# Patient Record
Sex: MALE | Race: WHITE | NOT HISPANIC OR LATINO | Employment: FULL TIME | URBAN - METROPOLITAN AREA
[De-identification: names, ages, dates, MRNs, and addresses within clinical notes are randomized per-mention and may not be internally consistent; named-entity substitution may affect disease eponyms.]

---

## 2019-07-19 ENCOUNTER — HOSPITAL ENCOUNTER (EMERGENCY)
Facility: HOSPITAL | Age: 24
Discharge: HOME | End: 2019-07-19
Attending: EMERGENCY MEDICINE | Admitting: EMERGENCY MEDICINE
Payer: COMMERCIAL

## 2019-07-19 VITALS
BODY MASS INDEX: 19.62 KG/M2 | TEMPERATURE: 97.8 F | DIASTOLIC BLOOD PRESSURE: 72 MMHG | HEART RATE: 88 BPM | RESPIRATION RATE: 16 BRPM | HEIGHT: 67 IN | OXYGEN SATURATION: 98 % | WEIGHT: 125 LBS | SYSTOLIC BLOOD PRESSURE: 117 MMHG

## 2019-07-19 DIAGNOSIS — T67.5XXA HEAT EXHAUSTION, INITIAL ENCOUNTER: ICD-10-CM

## 2019-07-19 DIAGNOSIS — R42 LIGHTHEADEDNESS: Primary | ICD-10-CM

## 2019-07-19 DIAGNOSIS — E86.0 DEHYDRATION: ICD-10-CM

## 2019-07-19 LAB
ATRIAL RATE: 84 BPM
BACTERIA UR QL AUTO: NORMAL /HPF
BILIRUB UR QL STRIP: NEGATIVE
CLARITY UR: CLEAR
COLOR UR: YELLOW
COLOR, POC: NORMAL
GLUCOSE UR STRIP-MCNC: NEGATIVE MG/DL
HGB UR QL STRIP.AUTO: NEGATIVE
HYALINE CASTS #/AREA URNS LPF: NORMAL /LPF
KETONES UR STRIP-MCNC: NEGATIVE MG/DL
LEUKOCYTE ESTERASE UR QL STRIP: NEGATIVE
NITRITE UR QL STRIP: NEGATIVE
NON-SQ EPI CELLS URNS QL MICRO: NORMAL /HPF
P AXIS: 66 DEGREES
PH UR STRIP.AUTO: 6.5 [PH] (ref 4.5–8)
PR INTERVAL: 186 MS
PROT UR STRIP-MCNC: ABNORMAL MG/DL
QRS AXIS: 47 DEGREES
QRSD INTERVAL: 96 MS
QT INTERVAL: 348 MS
QTC INTERVAL: 411 MS
RBC #/AREA URNS AUTO: NORMAL /HPF
SP GR UR STRIP.AUTO: 1.02 (ref 1–1.03)
T WAVE AXIS: 62 DEGREES
UROBILINOGEN UR QL STRIP.AUTO: 0.2 E.U./DL
VENTRICULAR RATE: 84 BPM
WBC #/AREA URNS AUTO: NORMAL /HPF

## 2019-07-19 PROCEDURE — 93010 ELECTROCARDIOGRAM REPORT: CPT | Performed by: INTERNAL MEDICINE

## 2019-07-19 PROCEDURE — 99284 EMERGENCY DEPT VISIT MOD MDM: CPT | Performed by: EMERGENCY MEDICINE

## 2019-07-19 PROCEDURE — 81001 URINALYSIS AUTO W/SCOPE: CPT

## 2019-07-19 PROCEDURE — 96361 HYDRATE IV INFUSION ADD-ON: CPT

## 2019-07-19 PROCEDURE — 96374 THER/PROPH/DIAG INJ IV PUSH: CPT

## 2019-07-19 PROCEDURE — 99284 EMERGENCY DEPT VISIT MOD MDM: CPT

## 2019-07-19 PROCEDURE — 93005 ELECTROCARDIOGRAM TRACING: CPT

## 2019-07-19 RX ORDER — ONDANSETRON 2 MG/ML
4 INJECTION INTRAMUSCULAR; INTRAVENOUS ONCE
Status: COMPLETED | OUTPATIENT
Start: 2019-07-19 | End: 2019-07-19

## 2019-07-19 RX ADMIN — SODIUM CHLORIDE 1000 ML: 0.9 INJECTION, SOLUTION INTRAVENOUS at 12:36

## 2019-07-19 RX ADMIN — ONDANSETRON 4 MG: 2 INJECTION INTRAMUSCULAR; INTRAVENOUS at 12:36

## 2019-07-19 NOTE — ED PROVIDER NOTES
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 , he did eat breakfast this morning but has not been drinking the appropriate amount of fluids  He states he was outdoors when his symptoms started suddenly, he felt very lightheaded and went inside to sit down; a co-worker was with him and noted that he was slumping forward in the chair and seemed generally weak  Per co-worker, he told them that he felt as if the carpet was jumping around him    States it felt as if the room was spinning somewhat as well  Currently denies vertigo but does endorse lightheadedness and nausea that has persisted  He denies headache, neck pain or neck stiffness, no vision changes, no diplopia, no tinnitus or hearing changes, no focal weakness, denies sensory changes, no recent trauma to the head or neck  He denies chest pain, states he felt slightly short of breath when he was outdoors but after being indoors and resting this resolved  No palpitations, no history of DVT or PE, no family history of DVT or PE, no history of sudden cardiac death in his family, no history of arrhythmias  Patient denies presyncope, syncope, he has no past history of any these symptoms before  Sitting down in a chair did somewhat alleviated symptoms  Co-worker who was with him denies any abnormal eye movements at the time  Patient denies ETOH, denies illicit drug was, takes no medications        Dizziness   Quality:  Lightheadedness and head spinning  Onset quality:  Sudden  Timing:  Constant  Progression:  Partially resolved  Chronicity:  New  Context: not with ear pain, not with eye movement and not with loss of consciousness    Relieved by:  Being still  Worsened by:  Standing up  Associated symptoms: nausea    Associated symptoms: no blood in stool, no chest pain, no diarrhea, no headaches, no hearing loss, no palpitations, no shortness of breath, no syncope, no tinnitus, no vision changes, no vomiting and no weakness    Risk factors: no anemia, no heart disease, no hx of stroke, no hx of vertigo, no Meniere's disease, no multiple medications and no new medications        None       History reviewed  No pertinent past medical history  History reviewed  No pertinent surgical history  History reviewed  No pertinent family history  I have reviewed and agree with the history as documented  Social History     Tobacco Use    Smoking status: Not on file   Substance Use Topics    Alcohol use: Not on file    Drug use: Not on file        Review of Systems   Constitutional: Negative  Negative for appetite change, chills and fever  HENT: Negative  Negative for hearing loss and tinnitus  Eyes: Negative  Negative for photophobia and visual disturbance  Respiratory: Negative  Negative for cough, chest tightness and shortness of breath  Cardiovascular: Negative  Negative for chest pain, palpitations, leg swelling and syncope  Gastrointestinal: Positive for nausea  Negative for abdominal pain, blood in stool, constipation, diarrhea and vomiting  Endocrine: Negative  Genitourinary: Negative  Negative for difficulty urinating, dysuria, flank pain, frequency and urgency  Musculoskeletal: Negative  Negative for back pain, neck pain and neck stiffness  Skin: Negative  Allergic/Immunologic: Negative  Neurological: Positive for dizziness and light-headedness  Negative for weakness and headaches  Hematological: Negative  Psychiatric/Behavioral: Negative          Physical Exam  ED Triage Vitals   Temperature Pulse Respirations Blood Pressure SpO2 07/19/19 1209 07/19/19 1208 07/19/19 1208 07/19/19 1208 07/19/19 1208   97 8 °F (36 6 °C) 97 20 127/87 98 %      Temp Source Heart Rate Source Patient Position - Orthostatic VS BP Location FiO2 (%)   07/19/19 1209 07/19/19 1245 07/19/19 1245 07/19/19 1245 --   Oral Monitor Lying Left arm       Pain Score       07/19/19 1208       No Pain             Orthostatic Vital Signs  Vitals:    07/19/19 1401 07/19/19 1415 07/19/19 1430 07/19/19 1445   BP: 109/64  117/72    Pulse: 104 88 (!) 106 88   Patient Position - Orthostatic VS:           Physical Exam   Constitutional: He is oriented to person, place, and time  He appears well-developed and well-nourished  HENT:   Head: Normocephalic and atraumatic  Right Ear: External ear normal    Left Ear: External ear normal    Nose: Nose normal    Dry mucous membranes   Eyes: Conjunctivae and EOM are normal  No scleral icterus  Neck: Normal range of motion  No JVD present  No tracheal deviation present  Cardiovascular: Regular rhythm, normal heart sounds and intact distal pulses  No murmur heard  Tachycardic regular   Pulmonary/Chest: Effort normal and breath sounds normal  No respiratory distress  Abdominal: Soft  Bowel sounds are normal  He exhibits no distension  There is no tenderness  There is no guarding  Musculoskeletal: Normal range of motion  He exhibits no edema  Neurological: He is alert and oriented to person, place, and time  He exhibits normal muscle tone  Patient is alert and oriented to person, place, time, and situation  Speech is normal with no dysarthria, no aphasia  Cranial nerves II-XII intact  Sensation is intact bilaterally upper and lower extremities  Normal muscle tone, no clonus, no atrophy  5/5 muscle strength bilaterally upper extremities, 5/5 muscle strength bilaterally lower extremities  Finger-to-nose, heel-to-shin testing normal bilaterally  No pronator drift   Normal gait, steady, able to ambulate without difficulties, normal base       Skin: Skin is warm and dry  Capillary refill takes less than 2 seconds  He is not diaphoretic  Psychiatric: He has a normal mood and affect  His behavior is normal    Vitals reviewed  ED Medications  Medications   sodium chloride 0 9 % bolus 1,000 mL (0 mL Intravenous Stopped 7/19/19 1401)   ondansetron (ZOFRAN) injection 4 mg (4 mg Intravenous Given 7/19/19 1236)       Diagnostic Studies  Results Reviewed     Procedure Component Value Units Date/Time    POCT urinalysis dipstick [[id number]]  (Normal) Resulted:  07/19/19 1452    Lab Status:  Final result Updated:  07/19/19 1453     Color, UA -    Urine Microscopic [[id number]] Collected:  07/19/19 1447    Lab Status:   In process Specimen:  Urine, Clean Catch Updated:  07/19/19 1446    ED Urine Macroscopic [[id number]]  (Abnormal) Collected:  07/19/19 1447    Lab Status:  Final result Specimen:  Urine Updated:  07/19/19 1442     Color, UA Yellow     Clarity, UA Clear     pH, UA 6 5     Leukocytes, UA Negative     Nitrite, UA Negative     Protein, UA Trace mg/dl      Glucose, UA Negative mg/dl      Ketones, UA Negative mg/dl      Urobilinogen, UA 0 2 E U /dl      Bilirubin, UA Negative     Blood, UA Negative     Specific Gravity, UA 1 020    Narrative:       CLINITEK RESULT                 No orders to display         Procedures  ECG 12 Lead Documentation Only  Date/Time: 7/19/2019 3:04 PM  Performed by: Travis Mandujano DO  Authorized by: Travis Mandujano DO     ECG reviewed by me, the ED Provider: yes    Patient location:  ED  Previous ECG:     Previous ECG:  Unavailable  Interpretation:     Interpretation: normal    Rate:     ECG rate assessment: normal    Rhythm:     Rhythm: sinus rhythm    Ectopy:     Ectopy: none    QRS:     QRS axis:  Normal    QRS intervals:  Normal  Conduction:     Conduction: normal    ST segments:     ST segments:  Normal  T waves:     T waves: normal              ED Course  ED Course as of Jul 19 1503 Fri Jul 19, 2019 1336 Patient re-evaluated, tachycardia resolved right now 84 on telemetry, sinus rhythm  His nausea is resolved, his lightheadedness is improved  1441 Patient re-evaluated, heart rate is 88, sinus rhythm on telemetry  He states he feels well, is lightheadedness has resolved  He states that he feels tired, and that he did not sleep at all last night, would like to go home and rest   Discussed return precautions with the patient, instructed on rest and oral fluid for rehydration  Patient on knowledge is understanding, will return if needed  Work note given to patient for today  MDM  Number of Diagnoses or Management Options  Dehydration: new and requires workup  Heat exhaustion, initial encounter: new and requires workup  Lightheadedness: new and requires workup  Diagnosis management comments: 3 57-year-old previously healthy male working outdoors in the heat for several hours without proper hydration  Dry mucous membranes on exam as well as tachycardia  Will give 1 L of IV fluids for rehydration  Will obtain a urine  2  Normal UA  Vital signs have normalized  Patient's symptoms have resolved  His EKG shows sinus rhythm with normal intervals and no acute T-wave or ST changes  3  Discharge patient home with return precautions, patient given a work note  Amount and/or Complexity of Data Reviewed  Clinical lab tests: ordered and reviewed  Independent visualization of images, tracings, or specimens: yes        Disposition  Final diagnoses:   Lightheadedness   Heat exhaustion, initial encounter   Dehydration     Time reflects when diagnosis was documented in both MDM as applicable and the Disposition within this note     Time User Action Codes Description Comment    7/19/2019  2:53 PM Mercedes Gut Add [R42] Lightheadedness     7/19/2019  2:53 PM Mercedes Gut Add Mikaela Lips  5XXA] Heat exhaustion, initial encounter     7/19/2019  2:53 PM Mercedes Gut Add [E86 0] Dehydration       ED Disposition     ED Disposition Condition Date/Time Comment    Discharge Stable Fri Jul 19, 2019  2:53 PM Tahira Sheikh discharge to home/self care  Follow-up Information     Follow up With Specialties Details Why Contact Info Additional [address] Emergency Department Emergency Medicine Go to  As needed, If symptoms worsen [address] [facility] ED, [address] [place] [address]  Call in 1 day to arrange for a primary care doctor [phone]             Patient's Medications    No medications on file     No discharge procedures on file  ED Provider  Attending physically available and evaluated Tahira Sheikh I managed the patient along with the ED Attending      Electronically Signed by         Ronald Mason DO  07/19/19 1350 Cheko James DO  07/19/19 2448

## 2019-07-19 NOTE — ED ATTENDING ATTESTATION
Kne Madrigal MD, saw and evaluated the patient  I have discussed the patient with the resident/non-physician practitioner and agree with the resident's/non-physician practitioner's findings, Plan of Care, and MDM as documented in the resident's/non-physician practitioner's note, except where noted  All available labs and Radiology studies were reviewed  I was present for key portions of any procedure(s) performed by the resident/non-physician practitioner and I was immediately available to provide assistance  At this point I agree with the current assessment done in the Emergency Department  I have conducted an independent evaluation of this patient a history and physical is as follows:   Working out in the [employer] and dizzy  With no HA  No fever no chills no cough   Has nausea with no vomiting  No myalgias    No visual changes    pmh negative  Denies drugs or etoh   EXAM:   Const:   well appearing   NAD     HEENT:  NCAT    sclera anicteric conjunctiva pink   throat clear, MMM    Neck:   supple  no meningismus  no jvd   no bruits  no  midline tenderness   Lungs:   clear  CW non-tender   No creiptation  Heart:   RRR no m/g/r  Normal pulses  Abd:   soft nt nd pos bs   Ext:    normal nontender  No edema  Neruo:   CN 2 -12 intact  motor intact 5/5 sensory intact cerebellar intact       Gait normal    IMPRESSION:  Heat exhaustion   PLAN:  IVF  And zofran       Critical Care Time  Procedures

## 2019-07-19 NOTE — DISCHARGE INSTRUCTIONS
Rest today, drink plenty of fluids, and avoid further heat exposure  Your EKG was normal today, urine showed no ketones and was without blood or infection  You were given 1 L of fluid for rehydration, vital signs are stable  Return to the emergency department for any of the following but not limited to symptoms:  Worsening lightheadedness, chest pain, shortness of breath, syncope (passing out)